# Patient Record
Sex: MALE | Race: WHITE | NOT HISPANIC OR LATINO | Employment: UNEMPLOYED | URBAN - METROPOLITAN AREA
[De-identification: names, ages, dates, MRNs, and addresses within clinical notes are randomized per-mention and may not be internally consistent; named-entity substitution may affect disease eponyms.]

---

## 2018-11-05 ENCOUNTER — OFFICE VISIT CONVERTED (OUTPATIENT)
Dept: ORTHOPEDIC SURGERY | Facility: CLINIC | Age: 53
End: 2018-11-05
Attending: ORTHOPAEDIC SURGERY

## 2018-11-12 ENCOUNTER — OFFICE VISIT CONVERTED (OUTPATIENT)
Dept: ORTHOPEDIC SURGERY | Facility: CLINIC | Age: 53
End: 2018-11-12
Attending: ORTHOPAEDIC SURGERY

## 2018-11-30 ENCOUNTER — OFFICE VISIT CONVERTED (OUTPATIENT)
Dept: NEUROSURGERY | Facility: CLINIC | Age: 53
End: 2018-11-30
Attending: NEUROLOGICAL SURGERY

## 2019-01-07 ENCOUNTER — OFFICE VISIT CONVERTED (OUTPATIENT)
Dept: NEUROSURGERY | Facility: CLINIC | Age: 54
End: 2019-01-07
Attending: NEUROLOGICAL SURGERY

## 2021-05-15 VITALS
WEIGHT: 191.31 LBS | SYSTOLIC BLOOD PRESSURE: 154 MMHG | HEIGHT: 68 IN | DIASTOLIC BLOOD PRESSURE: 93 MMHG | BODY MASS INDEX: 29 KG/M2

## 2021-05-16 VITALS
BODY MASS INDEX: 29.1 KG/M2 | WEIGHT: 192 LBS | HEIGHT: 68 IN | SYSTOLIC BLOOD PRESSURE: 166 MMHG | DIASTOLIC BLOOD PRESSURE: 82 MMHG | HEART RATE: 79 BPM

## 2021-05-16 VITALS — BODY MASS INDEX: 29.1 KG/M2 | WEIGHT: 192 LBS | HEIGHT: 68 IN | OXYGEN SATURATION: 96 % | HEART RATE: 75 BPM

## 2021-05-16 VITALS — OXYGEN SATURATION: 95 % | HEIGHT: 68 IN | WEIGHT: 196 LBS | BODY MASS INDEX: 29.7 KG/M2 | HEART RATE: 80 BPM

## 2022-04-04 ENCOUNTER — TRANSCRIBE ORDERS (OUTPATIENT)
Dept: ADMINISTRATIVE | Facility: HOSPITAL | Age: 57
End: 2022-04-04

## 2022-04-04 DIAGNOSIS — M54.50 LOW BACK PAIN, UNSPECIFIED BACK PAIN LATERALITY, UNSPECIFIED CHRONICITY, UNSPECIFIED WHETHER SCIATICA PRESENT: Primary | ICD-10-CM

## 2022-04-22 ENCOUNTER — HOSPITAL ENCOUNTER (OUTPATIENT)
Dept: MRI IMAGING | Facility: HOSPITAL | Age: 57
Discharge: HOME | End: 2022-04-22
Admitting: FAMILY MEDICINE

## 2022-04-22 DIAGNOSIS — M54.50 LOW BACK PAIN, UNSPECIFIED BACK PAIN LATERALITY, UNSPECIFIED CHRONICITY, UNSPECIFIED WHETHER SCIATICA PRESENT: ICD-10-CM

## 2022-04-22 PROCEDURE — 72148 MRI LUMBAR SPINE W/O DYE: CPT

## 2022-05-03 ENCOUNTER — OFFICE VISIT (OUTPATIENT)
Dept: NEUROSURGERY | Facility: CLINIC | Age: 57
End: 2022-05-03

## 2022-05-03 VITALS
SYSTOLIC BLOOD PRESSURE: 153 MMHG | WEIGHT: 197.6 LBS | HEART RATE: 64 BPM | HEIGHT: 68 IN | BODY MASS INDEX: 29.95 KG/M2 | DIASTOLIC BLOOD PRESSURE: 86 MMHG

## 2022-05-03 DIAGNOSIS — M47.816 FACET ARTHROPATHY, LUMBAR: ICD-10-CM

## 2022-05-03 DIAGNOSIS — G89.29 CHRONIC MIDLINE LOW BACK PAIN WITHOUT SCIATICA: ICD-10-CM

## 2022-05-03 DIAGNOSIS — M54.50 CHRONIC MIDLINE LOW BACK PAIN WITHOUT SCIATICA: ICD-10-CM

## 2022-05-03 DIAGNOSIS — M51.36 DEGENERATION OF L4-L5 INTERVERTEBRAL DISC: Primary | ICD-10-CM

## 2022-05-03 DIAGNOSIS — M43.16 SPONDYLOLISTHESIS AT L4-L5 LEVEL: ICD-10-CM

## 2022-05-03 PROCEDURE — 99204 OFFICE O/P NEW MOD 45 MIN: CPT | Performed by: PHYSICIAN ASSISTANT

## 2022-05-03 RX ORDER — ATORVASTATIN CALCIUM 80 MG/1
TABLET, FILM COATED ORAL
COMMUNITY
Start: 2022-04-02

## 2022-05-03 RX ORDER — ASPIRIN 81 MG/1
TABLET ORAL
COMMUNITY

## 2022-05-03 RX ORDER — LISINOPRIL 20 MG/1
TABLET ORAL
COMMUNITY
Start: 2022-04-02

## 2022-05-03 RX ORDER — ATENOLOL 50 MG/1
TABLET ORAL
COMMUNITY
Start: 2022-04-02

## 2022-05-03 NOTE — PROGRESS NOTES
"Chief Complaint  Back Pain, Numbness (Both feet/Both hands), and Tingling (Both feet/Both hands)    Subjective          Jared Santos who is a 56 y.o. year old male who presents to [facility] for Evaluation of the Spine.     The patient complains of pain located in the Lumbar Spine.  Patients states the pain has been present for 5 years.  The pain came on gradually.  The pain scaled level is 0.  The pain does not radiate.  The pain is Intermittent and described as sharp.  The pain is worse at no particular time of day. Patient states sitting or laying down makes the pain better.  Patient states standing and walking makes the pain worse.    Associated Symptoms Include: Numbness in both feet. He reports subjective weakness in both feet. He denies loss of bowel or bladder control.  Conservative Interventions Include: Physical Therapy that was ineffective.    Was this the result of an injury or accident?: No    History of Previous Spinal Surgery?: No     reports that he quit smoking about 2 years ago. He quit after 1.00 year of use. He uses smokeless tobacco.    Review of Systems   Musculoskeletal: Positive for back pain.   Neurological: Positive for weakness and numbness.        Objective   Vital Signs:   /86   Pulse 64   Ht 172.7 cm (68\")   Wt 89.6 kg (197 lb 9.6 oz)   BMI 30.04 kg/m²       Physical Exam  Constitutional:       Appearance: Normal appearance. He is obese.   Pulmonary:      Effort: Pulmonary effort is normal.   Musculoskeletal:         General: No tenderness.      Comments: SLR negative bilaterally   Neurological:      General: No focal deficit present.      Mental Status: He is alert and oriented to person, place, and time.      Sensory: No sensory deficit.      Motor: No weakness.      Deep Tendon Reflexes: Reflexes normal.   Psychiatric:         Mood and Affect: Mood normal.         Behavior: Behavior normal.        Neurologic Exam     Mental Status "   Oriented to person, place, and time.        Result Review     I have personally viewed the MRI of lumbar spine without contrast from 4/22/2022 which shows severe spinal canal stenosis and bilateral foraminal stenosis at L4-L5 with mild anterolisthesis of L4 on L5.       Assessment and Plan    Diagnoses and all orders for this visit:    1. Degeneration of L4-L5 intervertebral disc (Primary)    2. Spondylolisthesis at L4-L5 level    3. Facet arthropathy, lumbar  -     Ambulatory Referral to Pain Management    4. Chronic midline low back pain without sciatica  -     Ambulatory Referral to Pain Management    His pain is in the lower back. I do not have a high level of confidence that a surgical approach would be helpful. He does have anterolisthesis at L4 on L5 and severe stenosis at this level, but I do not expect a laminectomy to help his back pain.    He could consider lumbar x-rays with flexion and extension views to assess stability of the anterolisthesis of the L4 on L5. He is deferring for now.    He might benefit from a trial of MBBs/RFA in the lumbar spine for the back pain itself. I will refer him to [facility] to consult for this.    He could benefit from a trial of physical therapy as well, but he would like to defer.    We discussed the importance of smoking/nicotine cessation. Smoking/nicotine use has multiple health risks. In particular related to the spine, nicotine increases the incidence of lower back pain, speeds up the progression of degenerative disc disease and dramatically reduces healing after spine surgery (particularly a fusion operation).     The patient was counseled on basic recommendations for the reduction and prevention of back, neck, or spine pain in association with spinal disorders, including: cessation/avoidance of nicotine use, maintenance of a healthy BMI and weight, focusing on building/maintaining core strength through core exercise, and avoidance of activities which worsen the pain.  The patient will monitor for changes in symptoms and notify our clinic of these changes as needed.    He will follow-up here PRN.    Follow Up   Return if symptoms worsen or fail to improve.  Patient was given instructions and counseling regarding his condition or for health maintenance advice. Please see specific information pulled into the AVS if appropriate.

## 2022-08-15 ENCOUNTER — APPOINTMENT (OUTPATIENT)
Dept: GENERAL RADIOLOGY | Facility: HOSPITAL | Age: 57
End: 2022-08-15

## 2022-08-15 PROCEDURE — 73590 X-RAY EXAM OF LOWER LEG: CPT

## 2022-08-15 PROCEDURE — 99283 EMERGENCY DEPT VISIT LOW MDM: CPT

## 2022-08-16 ENCOUNTER — HOSPITAL ENCOUNTER (EMERGENCY)
Facility: HOSPITAL | Age: 57
Discharge: HOME | End: 2022-08-16
Attending: EMERGENCY MEDICINE | Admitting: EMERGENCY MEDICINE

## 2022-08-16 VITALS
SYSTOLIC BLOOD PRESSURE: 126 MMHG | OXYGEN SATURATION: 96 % | BODY MASS INDEX: 30.04 KG/M2 | DIASTOLIC BLOOD PRESSURE: 84 MMHG | HEIGHT: 68 IN | TEMPERATURE: 98.7 F | HEART RATE: 58 BPM | RESPIRATION RATE: 18 BRPM

## 2022-08-16 DIAGNOSIS — S81.812A LACERATION OF LEFT LOWER EXTREMITY, INITIAL ENCOUNTER: Primary | ICD-10-CM

## 2022-08-16 PROCEDURE — 90715 TDAP VACCINE 7 YRS/> IM: CPT | Performed by: NURSE PRACTITIONER

## 2022-08-16 PROCEDURE — 25010000002 TETANUS-DIPHTH-ACELL PERTUSSIS 5-2.5-18.5 LF-MCG/0.5 SUSPENSION PREFILLED SYRINGE: Performed by: NURSE PRACTITIONER

## 2022-08-16 PROCEDURE — 90471 IMMUNIZATION ADMIN: CPT | Performed by: NURSE PRACTITIONER

## 2022-08-16 RX ORDER — LIDOCAINE HYDROCHLORIDE AND EPINEPHRINE 10; 10 MG/ML; UG/ML
10 INJECTION, SOLUTION INFILTRATION; PERINEURAL ONCE
Status: COMPLETED | OUTPATIENT
Start: 2022-08-16 | End: 2022-08-16

## 2022-08-16 RX ADMIN — TETANUS TOXOID, REDUCED DIPHTHERIA TOXOID AND ACELLULAR PERTUSSIS VACCINE, ADSORBED 0.5 ML: 5; 2.5; 8; 8; 2.5 SUSPENSION INTRAMUSCULAR at 01:27

## 2022-08-16 RX ADMIN — LIDOCAINE HYDROCHLORIDE,EPINEPHRINE BITARTRATE 10 ML: 10; .01 INJECTION, SOLUTION INFILTRATION; PERINEURAL at 02:06

## 2022-08-16 NOTE — DISCHARGE INSTRUCTIONS
Keep your wound clean and dry.  Wash with soap and water twice daily, then pat dry.  Keep your wound covered while working outside.  Sutures can be removed in 7 days.  Monitor and return for signs of infection such as pus drainage, worsening swelling, red streaking, fever or any other complaints.

## 2022-08-16 NOTE — ED PROVIDER NOTES
"Time: 9:00 PM EDT  Arrived by: private car  Chief Complaint: Left leg laceration  History provided by: Patient    History of Present Illness:  Patient is a 56 y.o. year old male who presents to the emergency department with left lower leg laceration that he sustained while mowing on his mower when a wire got caught under it and slung out and hit him in the leg.  He denies any other injuries.  Tetanus is not up-to-date        HPI    Similar Symptoms Previously: No  Recently seen: No      Patient Care Team  Primary Care Provider: Shahid Sandy Jr., MD    Past Medical History:     No Known Allergies  No past medical history on file.  No past surgical history on file.  No family history on file.    Home Medications:  Prior to Admission medications    Medication Sig Start Date End Date Taking? Authorizing Provider   aspirin (aspirin) 81 MG EC tablet Aspir-81 81 mg oral tablet,delayed release (DR/EC) take 1 tablet (81 mg) by oral route once daily   Active    Provider, MD Kaitlin   atenolol (TENORMIN) 50 MG tablet  22   ProviderKaitlin MD   atorvastatin (LIPITOR) 80 MG tablet  22   ProviderKaitlin MD   lisinopril (PRINIVIL,ZESTRIL) 20 MG tablet  22   Provider, MD Kaitlin        Social History:   Social History     Tobacco Use   • Smoking status: Former Smoker     Years: 1.00     Quit date:      Years since quittin.6   • Smokeless tobacco: Current User   Substance Use Topics   • Drug use: Not Currently     Types: Marijuana     Recent travel: no     Review of Systems:  Review of Systems   I performed a 10 point review of systems which was all negative, except for the positives found in the HPI above.  Physical Exam:  /84 (BP Location: Left arm, Patient Position: Lying)   Pulse 58   Temp 98.7 °F (37.1 °C) (Oral)   Resp 18   Ht 172.7 cm (68\")   SpO2 96%   BMI 30.04 kg/m²     Physical Exam       General: Awake alert and in no acute distress     HEENT: Head normocephalic " atraumatic, eyes PERRLA EOMI, nose normal, oropharynx normal.     Neck: Supple full range of motion, no meningismus, no lymphadenopathy     Heart: Regular rate and rhythm, no murmurs or rubs, 2+ radial pulses bilaterally     Lungs: Clear to auscultation bilaterally without wheezes or crackles, no respiratory distress     Abdomen: Soft, nontender, nondistended, no rebound or guarding     Back exam:  No L-spine tenderness.  No rash.     Skin: Warm, dry, no rash, 21 cm curved laceration of varying depth to anterior left lower leg     Musculoskeletal: Normal range of motion, no lower extremity edema     Neurologic: Oriented x3, no motor deficits no sensory deficits     Psychiatric: Mood appears stable, no psychosis        Medications in the Emergency Department:  Medications   Tetanus-Diphth-Acell Pertussis (BOOSTRIX) injection 0.5 mL (0.5 mL Intramuscular Given 8/16/22 0127)   lidocaine 1% - EPINEPHrine 1:[id number] (XYLOCAINE W/EPI) 1 %-1:095067 injection 10 mL (10 mL Injection Given 8/16/22 0206)        Labs  Lab Results (last 24 hours)     ** No results found for the last 24 hours. **           Imaging:  XR Tibia Fibula 2 View Left    Result Date: 8/15/2022  PROCEDURE: XR TIBIA FIBULA 2 VW LEFT  COMPARISON: None  INDICATIONS: CUT LEFT LOWER ANTERIOR LEG ON MOWER LACERATION TO LOWER LEG ANTERIOR  FINDINGS:  There is no acute fracture or dislocation. Joint spaces appear normal. No erosions. Soft tissues are unremarkable.  There is no soft tissue gas.  No radiodense foreign body.         No acute osseous abnormality or significant degenerative change.       TIERNEY HITCHCOCK MD       Electronically Signed and Approved By: TIERNEY HITCHCOCK MD on 8/15/2022 at 23:07               Procedures:  Laceration Repair    Date/Time: 8/16/2022 2:13 AM  Performed by: Waleska Gastelum APRN  Authorized by: Armand Arevalo DO     Consent:     Consent obtained:  Verbal    Consent given by:  Patient    Risks, benefits, and alternatives were  discussed: yes      Risks discussed:  Infection, need for additional repair, nerve damage, pain, poor cosmetic result, poor wound healing, retained foreign body, tendon damage and vascular damage    Alternatives discussed:  No treatment, delayed treatment, observation and referral  Universal protocol:     Patient identity confirmed:  Verbally with patient  Anesthesia:     Anesthesia method:  Local infiltration    Local anesthetic:  Lidocaine 1% WITH epi  Laceration details:     Location:  Leg    Leg location:  L lower leg    Length (cm):  21    Depth (mm):  4  Exploration:     Hemostasis achieved with:  Epinephrine  Treatment:     Area cleansed with:  Povidone-iodine and saline    Irrigation solution:  Sterile saline    Irrigation method:  Syringe  Skin repair:     Repair method:  Sutures    Suture size:  5-0    Suture material:  Nylon    Suture technique:  Simple interrupted    Number of sutures:  26  Approximation:     Approximation:  Close  Repair type:     Repair type:  Simple  Post-procedure details:     Dressing:  Non-adherent dressing    Procedure completion:  Tolerated well, no immediate complications        Progress  ED Course as of 08/16/22 0228   Mon Aug 15, 2022   2100 --- PROVIDER IN TRIAGE NOTE ---    Patient was evaluated in triage by Ginger garcia.  Orders were written and the patient is currently awaiting disposition. [DS]      ED Course User Index  [DS] Ginger Velez APRN                            The patient was initially evaluated in the triage area where orders were placed. The patient was later dispositioned by LOGAN Osborne.      Medical Decision Making:  MDM  Number of Diagnoses or Management Options  Laceration of left lower extremity, initial encounter  Diagnosis management comments: The patient presented with a laceration in need of repair. See laceration repair note for details. The wound was irrigated with copious normal saline irrigation.  5-0 nylon sutures were used to  approximate the wound edges. Tetanus was given. The patient tolerated the procedure well. Acute bleeding has ceased and the wound was approximated in the emergency department. Patient was counseled to keep the wound clean, dry, and out of the sun. Patient was counseled to change dressings daily. Patient was advised to return to the ED for worsening erythema, pain, swelling, fever, excessive drainage or signs of infection. They were counseled to follow up for suture removal as described in the discharge instructions. Patient verbalizes understanding and agrees to follow up as instructed.       Amount and/or Complexity of Data Reviewed  Tests in the radiology section of CPT®: ordered and reviewed    Risk of Complications, Morbidity, and/or Mortality  Presenting problems: low  Diagnostic procedures: low  Management options: low    Patient Progress  Patient progress: stable       Final diagnoses:   Laceration of left lower extremity, initial encounter        Disposition:  ED Disposition     ED Disposition   Discharge    Condition   Stable    Comment   --             This medical record created using voice recognition software.           Waleska Gastelum APRN  08/16/22 0228